# Patient Record
Sex: MALE | Race: ASIAN | Employment: UNEMPLOYED | URBAN - METROPOLITAN AREA
[De-identification: names, ages, dates, MRNs, and addresses within clinical notes are randomized per-mention and may not be internally consistent; named-entity substitution may affect disease eponyms.]

---

## 2023-09-10 ENCOUNTER — APPOINTMENT (OUTPATIENT)
Dept: GENERAL RADIOLOGY | Age: 23
End: 2023-09-10

## 2023-09-10 ENCOUNTER — HOSPITAL ENCOUNTER (EMERGENCY)
Age: 23
Discharge: HOME | End: 2023-09-10
Attending: EMERGENCY MEDICINE

## 2023-09-10 VITALS
HEART RATE: 50 BPM | RESPIRATION RATE: 14 BRPM | OXYGEN SATURATION: 96 % | DIASTOLIC BLOOD PRESSURE: 72 MMHG | TEMPERATURE: 97.9 F | SYSTOLIC BLOOD PRESSURE: 114 MMHG | BODY MASS INDEX: 22.4 KG/M2 | HEIGHT: 67 IN | WEIGHT: 142.7 LBS

## 2023-09-10 DIAGNOSIS — M25.561 ACUTE PAIN OF RIGHT KNEE: Primary | ICD-10-CM

## 2023-09-10 DIAGNOSIS — M25.571 ACUTE RIGHT ANKLE PAIN: ICD-10-CM

## 2023-09-10 PROCEDURE — 73610 X-RAY EXAM OF ANKLE: CPT

## 2023-09-10 PROCEDURE — 73562 X-RAY EXAM OF KNEE 3: CPT

## 2023-09-10 PROCEDURE — 73560 X-RAY EXAM OF KNEE 1 OR 2: CPT

## 2023-09-10 PROCEDURE — 99284 EMERGENCY DEPT VISIT MOD MDM: CPT

## 2023-09-10 PROCEDURE — 6370000000 HC RX 637 (ALT 250 FOR IP): Performed by: EMERGENCY MEDICINE

## 2023-09-10 PROCEDURE — 73600 X-RAY EXAM OF ANKLE: CPT

## 2023-09-10 RX ORDER — NAPROXEN 500 MG/1
500 TABLET ORAL 2 TIMES DAILY WITH MEALS
Qty: 60 TABLET | Refills: 3 | Status: SHIPPED | OUTPATIENT
Start: 2023-09-10

## 2023-09-10 RX ORDER — ACETAMINOPHEN 325 MG/1
650 TABLET ORAL EVERY 6 HOURS PRN
Qty: 40 TABLET | Refills: 0 | Status: SHIPPED | OUTPATIENT
Start: 2023-09-10 | End: 2023-09-15

## 2023-09-10 RX ORDER — ACETAMINOPHEN 325 MG/1
650 TABLET ORAL ONCE
Status: COMPLETED | OUTPATIENT
Start: 2023-09-10 | End: 2023-09-10

## 2023-09-10 RX ORDER — IBUPROFEN 400 MG/1
400 TABLET ORAL ONCE
Status: COMPLETED | OUTPATIENT
Start: 2023-09-10 | End: 2023-09-10

## 2023-09-10 RX ADMIN — IBUPROFEN 400 MG: 400 TABLET, FILM COATED ORAL at 07:25

## 2023-09-10 RX ADMIN — ACETAMINOPHEN 650 MG: 325 TABLET ORAL at 07:24

## 2023-09-10 ASSESSMENT — PAIN SCALES - GENERAL
PAINLEVEL_OUTOF10: 9
PAINLEVEL_OUTOF10: 0
PAINLEVEL_OUTOF10: 9

## 2023-09-10 ASSESSMENT — PAIN DESCRIPTION - PAIN TYPE: TYPE: ACUTE PAIN

## 2023-09-10 ASSESSMENT — PAIN DESCRIPTION - ORIENTATION: ORIENTATION: RIGHT

## 2023-09-10 ASSESSMENT — PAIN DESCRIPTION - LOCATION
LOCATION: KNEE

## 2023-09-10 ASSESSMENT — PAIN - FUNCTIONAL ASSESSMENT: PAIN_FUNCTIONAL_ASSESSMENT: 0-10

## 2023-09-12 ASSESSMENT — ENCOUNTER SYMPTOMS
SHORTNESS OF BREATH: 0
CHEST TIGHTNESS: 0

## 2023-09-27 ENCOUNTER — HOSPITAL ENCOUNTER (EMERGENCY)
Age: 23
Discharge: HOME | End: 2023-09-28

## 2023-09-27 VITALS
HEART RATE: 99 BPM | RESPIRATION RATE: 14 BRPM | DIASTOLIC BLOOD PRESSURE: 83 MMHG | OXYGEN SATURATION: 100 % | SYSTOLIC BLOOD PRESSURE: 117 MMHG | TEMPERATURE: 98.8 F

## 2023-09-27 DIAGNOSIS — Z72.53 HIGH RISK BISEXUAL BEHAVIOR: ICD-10-CM

## 2023-09-27 DIAGNOSIS — R30.0 DYSURIA: Primary | ICD-10-CM

## 2023-09-27 LAB
BILIRUB UR QL STRIP.AUTO: NEGATIVE
CLARITY UR: CLEAR
COLOR UR: YELLOW
GLUCOSE UR STRIP.AUTO-MCNC: NEGATIVE MG/DL
HGB UR QL STRIP.AUTO: NEGATIVE
KETONES UR STRIP.AUTO-MCNC: NEGATIVE MG/DL
LEUKOCYTE ESTERASE UR QL STRIP.AUTO: NEGATIVE
NITRITE UR QL STRIP.AUTO: NEGATIVE
PH UR STRIP.AUTO: 6 [PH] (ref 5–8)
PROT UR STRIP.AUTO-MCNC: NEGATIVE MG/DL
SP GR UR STRIP.AUTO: 1.01 (ref 1–1.03)
UA COMPLETE W REFLEX CULTURE PNL UR: NORMAL
UA DIPSTICK W REFLEX MICRO PNL UR: NORMAL
URN SPEC COLLECT METH UR: NORMAL
UROBILINOGEN UR STRIP-ACNC: 0.2 E.U./DL

## 2023-09-27 PROCEDURE — 96372 THER/PROPH/DIAG INJ SC/IM: CPT

## 2023-09-27 PROCEDURE — 87591 N.GONORRHOEAE DNA AMP PROB: CPT

## 2023-09-27 PROCEDURE — 87491 CHLMYD TRACH DNA AMP PROBE: CPT

## 2023-09-27 PROCEDURE — 81003 URINALYSIS AUTO W/O SCOPE: CPT

## 2023-09-27 PROCEDURE — 99284 EMERGENCY DEPT VISIT MOD MDM: CPT

## 2023-09-27 RX ORDER — METRONIDAZOLE 500 MG/1
500 TABLET ORAL 2 TIMES DAILY
Qty: 14 TABLET | Refills: 0 | Status: SHIPPED | OUTPATIENT
Start: 2023-09-27 | End: 2023-10-04

## 2023-09-27 RX ORDER — DOXYCYCLINE HYCLATE 100 MG
100 TABLET ORAL 2 TIMES DAILY
Qty: 14 TABLET | Refills: 0 | Status: SHIPPED | OUTPATIENT
Start: 2023-09-27 | End: 2023-10-04

## 2023-09-27 RX ORDER — PHENAZOPYRIDINE HYDROCHLORIDE 100 MG/1
200 TABLET, FILM COATED ORAL ONCE
Status: COMPLETED | OUTPATIENT
Start: 2023-09-27 | End: 2023-09-28

## 2023-09-27 ASSESSMENT — PAIN - FUNCTIONAL ASSESSMENT: PAIN_FUNCTIONAL_ASSESSMENT: NONE - DENIES PAIN

## 2023-09-28 PROCEDURE — 2500000003 HC RX 250 WO HCPCS: Performed by: PHYSICIAN ASSISTANT

## 2023-09-28 PROCEDURE — 6370000000 HC RX 637 (ALT 250 FOR IP): Performed by: PHYSICIAN ASSISTANT

## 2023-09-28 PROCEDURE — 6360000002 HC RX W HCPCS: Performed by: PHYSICIAN ASSISTANT

## 2023-09-28 RX ORDER — PHENAZOPYRIDINE HYDROCHLORIDE 100 MG/1
100 TABLET, FILM COATED ORAL 3 TIMES DAILY PRN
Qty: 15 TABLET | Refills: 0 | Status: SHIPPED | OUTPATIENT
Start: 2023-09-28 | End: 2023-10-03

## 2023-09-28 RX ADMIN — PHENAZOPYRIDINE 200 MG: 100 TABLET ORAL at 00:01

## 2023-09-28 RX ADMIN — LIDOCAINE HYDROCHLORIDE 500 MG: 10 INJECTION, SOLUTION EPIDURAL; INFILTRATION; INTRACAUDAL; PERINEURAL at 00:02

## 2023-09-28 NOTE — ED PROVIDER NOTES
[address] [place] [address]  ED  EMERGENCY DEPARTMENT ENCOUNTER        Pt Name: Mily Baker  MRN: [id number]  [facility] 2000  Date of evaluation: 9/27/2023  Provider: Kamala Linton PA-C  PCP: No primary care provider on file. ED Attending: Prashant Akins MD      SHELBY. I have evaluated this patient. CHIEF COMPLAINT:     Chief Complaint   Patient presents with    Dysuria     Burning with urination on and off for 10 days, reorts negative STD work up one week ago in [facility]. HISTORY OF PRESENT ILLNESS:      History provided by the patient. No limitations. Mily Baker is a 26 y.o. male who arrives to the ED by private vehicle. Patient is here complaining of burning with urination as well as with ejaculation that has been going on for 10 days. Patient denies any penile discharge or genital rash/lesion. However, he does admit to being sexually active without protection. He tells me he has \"been an animal\". He is sexually active with both men and women. He estimates being sexually active with 3 women and 10-15 men in 2023. He states he never uses protection. He states \"the word condom is not in my vocabulary\". He states he was in [place] about a week ago and was evaluated for STDs. He was given antibiotics. However, continues to be symptomatic. He denies any genital swelling including no scrotal swelling. He denies testicular pain. No fevers or chills. Nursing Notes were reviewed     REVIEW OF SYSTEMS:     Review of Systems  Positives and pertinent negatives as per HPI. PAST MEDICAL HISTORY:   History reviewed. No pertinent past medical history. SURGICAL HISTORY:    History reviewed. No pertinent surgical history.     CURRENT MEDICATIONS:       Previous Medications    ACETAMINOPHEN (AMINOFEN) 325 MG TABLET    Take 2 tablets by mouth every 6 hours as needed for Pain    NAPROXEN (NAPROSYN) 500 MG TABLET    Take 1 tablet by mouth 2 times daily (with meals)

## 2023-09-29 LAB
C TRACH DNA UR QL NAA+PROBE: NEGATIVE
N GONORRHOEA DNA UR QL NAA+PROBE: NEGATIVE

## 2023-10-21 ENCOUNTER — HOSPITAL ENCOUNTER (EMERGENCY)
Age: 23
Discharge: LEFT BEFORE DISCHARGE | End: 2023-10-21
Attending: EMERGENCY MEDICINE

## 2023-10-21 ENCOUNTER — APPOINTMENT (OUTPATIENT)
Dept: GENERAL RADIOLOGY | Age: 23
End: 2023-10-21

## 2023-10-21 VITALS
TEMPERATURE: 98.2 F | WEIGHT: 144.6 LBS | RESPIRATION RATE: 16 BRPM | SYSTOLIC BLOOD PRESSURE: 128 MMHG | HEART RATE: 81 BPM | DIASTOLIC BLOOD PRESSURE: 68 MMHG | BODY MASS INDEX: 22.7 KG/M2 | HEIGHT: 67 IN | OXYGEN SATURATION: 99 %

## 2023-10-21 DIAGNOSIS — R09.81 NASAL CONGESTION: Primary | ICD-10-CM

## 2023-10-21 LAB
FLUAV RNA RESP QL NAA+PROBE: NOT DETECTED
FLUBV RNA RESP QL NAA+PROBE: NOT DETECTED
SARS-COV-2 RNA RESP QL NAA+PROBE: NOT DETECTED

## 2023-10-21 PROCEDURE — 87636 SARSCOV2 & INF A&B AMP PRB: CPT

## 2023-10-21 PROCEDURE — 99283 EMERGENCY DEPT VISIT LOW MDM: CPT

## 2023-10-21 ASSESSMENT — ENCOUNTER SYMPTOMS
FACIAL SWELLING: 0
GASTROINTESTINAL NEGATIVE: 1
EYES NEGATIVE: 1
TROUBLE SWALLOWING: 0
SORE THROAT: 0
RHINORRHEA: 1
ALLERGIC/IMMUNOLOGIC NEGATIVE: 1
RESPIRATORY NEGATIVE: 1
VOICE CHANGE: 0

## 2023-10-21 ASSESSMENT — PAIN - FUNCTIONAL ASSESSMENT
PAIN_FUNCTIONAL_ASSESSMENT: ACTIVITIES ARE NOT PREVENTED
PAIN_FUNCTIONAL_ASSESSMENT: 0-10

## 2023-10-21 ASSESSMENT — LIFESTYLE VARIABLES
HOW OFTEN DO YOU HAVE A DRINK CONTAINING ALCOHOL: 2-4 TIMES A MONTH
HOW MANY STANDARD DRINKS CONTAINING ALCOHOL DO YOU HAVE ON A TYPICAL DAY: 1 OR 2

## 2023-10-21 ASSESSMENT — PAIN DESCRIPTION - DESCRIPTORS: DESCRIPTORS: ACHING

## 2023-10-21 ASSESSMENT — PAIN SCALES - GENERAL: PAINLEVEL_OUTOF10: 2

## 2023-10-21 ASSESSMENT — PAIN DESCRIPTION - ONSET: ONSET: ON-GOING

## 2023-10-21 ASSESSMENT — PAIN DESCRIPTION - ORIENTATION: ORIENTATION: LEFT;LOWER

## 2023-10-21 ASSESSMENT — PAIN DESCRIPTION - PAIN TYPE: TYPE: ACUTE PAIN

## 2023-10-21 ASSESSMENT — PAIN DESCRIPTION - LOCATION: LOCATION: TEETH

## 2023-10-21 NOTE — ED PROVIDER NOTES
[address] [address] ED  EMERGENCY DEPARTMENT ENCOUNTER      Pt Name: Shannan Langley  MRN: [id number]  [address] [facility] 2000  Date of evaluation: 10/21/2023  Provider: Dillon Jean-Baptiste MD    CHIEF COMPLAINT       Chief Complaint   Patient presents with    Nasal Congestion     Pt states he needs to be seen and wants nose spray d/t having a stuffy nose for months and years. HISTORY OF PRESENT ILLNESS   (Location/Symptom, Timing/Onset, Context/Setting, Quality, Duration, Modifying Factors, Severity)  Note limiting factors. Shannan Langley is a 26 y.o. male who presents to the emergency department with difficulty breathing through his nose. He is inconsistent in how long this has been present, stating at one point that it has been months and years and then notes that it started 3 to 4 hours ago when he was in a police car where someone had body odor and smelled bad so it made his nose start running. Patient was drinking alcohol in the lobby. Initially told the nurse upfront that he needed to be admitted because he is homeless. He then states that he is having trouble breathing through his mouth but then states that it is because his nose is stuffy. He then complains that he is hungry and repeatedly asks for food        Nursing Notes were reviewed. REVIEW OF SYSTEMS    (2-9 systems for level 4, 10 or more for level 5)     Review of Systems   Constitutional: Negative. HENT:  Positive for congestion, postnasal drip and rhinorrhea. Negative for drooling, ear pain, facial swelling, sore throat, trouble swallowing and voice change. Eyes: Negative. Respiratory: Negative. Cardiovascular: Negative. Gastrointestinal: Negative. Endocrine: Negative. Genitourinary: Negative. Musculoskeletal: Negative. Skin: Negative. Allergic/Immunologic: Negative. Neurological: Negative. Hematological: Negative. Psychiatric/Behavioral:  The patient is hyperactive.         Except as

## 2025-03-27 ENCOUNTER — OFFICE VISIT (OUTPATIENT)
Dept: PRIMARY CARE CLINIC | Age: 25
End: 2025-03-27

## 2025-03-27 VITALS
HEIGHT: 67 IN | WEIGHT: 152 LBS | BODY MASS INDEX: 23.86 KG/M2 | SYSTOLIC BLOOD PRESSURE: 100 MMHG | DIASTOLIC BLOOD PRESSURE: 60 MMHG | HEART RATE: 73 BPM | OXYGEN SATURATION: 97 %

## 2025-03-27 DIAGNOSIS — F17.200 SMOKING: ICD-10-CM

## 2025-03-27 DIAGNOSIS — M25.511 CHRONIC PAIN OF BOTH SHOULDERS: ICD-10-CM

## 2025-03-27 DIAGNOSIS — M25.512 CHRONIC PAIN OF BOTH SHOULDERS: ICD-10-CM

## 2025-03-27 DIAGNOSIS — Z76.89 ENCOUNTER TO ESTABLISH CARE: Primary | ICD-10-CM

## 2025-03-27 DIAGNOSIS — G89.29 CHRONIC PAIN OF BOTH SHOULDERS: ICD-10-CM

## 2025-03-27 DIAGNOSIS — Z11.3 SCREENING FOR STD (SEXUALLY TRANSMITTED DISEASE): ICD-10-CM

## 2025-03-27 SDOH — ECONOMIC STABILITY: FOOD INSECURITY: WITHIN THE PAST 12 MONTHS, THE FOOD YOU BOUGHT JUST DIDN'T LAST AND YOU DIDN'T HAVE MONEY TO GET MORE.: NEVER TRUE

## 2025-03-27 SDOH — ECONOMIC STABILITY: FOOD INSECURITY: WITHIN THE PAST 12 MONTHS, YOU WORRIED THAT YOUR FOOD WOULD RUN OUT BEFORE YOU GOT MONEY TO BUY MORE.: NEVER TRUE

## 2025-03-27 ASSESSMENT — PATIENT HEALTH QUESTIONNAIRE - PHQ9
SUM OF ALL RESPONSES TO PHQ QUESTIONS 1-9: 0
SUM OF ALL RESPONSES TO PHQ QUESTIONS 1-9: 0
1. LITTLE INTEREST OR PLEASURE IN DOING THINGS: NOT AT ALL
SUM OF ALL RESPONSES TO PHQ QUESTIONS 1-9: 0
2. FEELING DOWN, DEPRESSED OR HOPELESS: NOT AT ALL
SUM OF ALL RESPONSES TO PHQ QUESTIONS 1-9: 0

## 2025-03-27 NOTE — PATIENT INSTRUCTIONS
[employer] channel  [email or web address]

## 2025-03-28 NOTE — PROGRESS NOTES
PROGRESS NOTE   [facility]                                  [address], [address], [facility] [address]         Phone: [phone]    Date of Service:  3/27/2025     Patient ID: .Maximo Gómez is a 24 y.o. male      Subjective:     CC: To establish care and right-sided shoulder pain and weakness    HPI   Maximo Gómez, a 24-year-old male with no significant past medical history came in with the above-mentioned concerns.    Personal history  He has immig[place] from [place], living with parents and siblings in [place] for last 4 years, moved from [place].  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    Shoulder pain and weakness  He described having pain in both shoulders for last 2 months, more pronounced in right shoulder.   Few years ago he had a regular routine of doing heavy weight exercises and he never experienced pain while working out.  He took a break from workout for 1 year and restarted again in September last year but after few months of muscle training he started feeling pain in both shoulders which is moderate in intensity, intermittent and associated with weakness.  He describes that he feels his shoulders are giving up during weightlifting.  This weakness is more pronounced on right side and he is really not able to reach out to his back while using right arm.  No associated fevers, malaise, myalgia, weight loss.  No history of diarrhea, joint pains, skin rash, lymph node swellings.  No history of intake of any medications or over-the-counter protein products.    Family history  No significant history of any medical disorder or malignancies.    No past medical history or surgical history  Allergies; none    Social history  Smokes nicotine 4-5 cigarettes every day, uses nicotine pouch  Has been a heavy smoker trying to cut down on nicotine use  No history of alcohol intake